# Patient Record
Sex: FEMALE | HISPANIC OR LATINO | URBAN - METROPOLITAN AREA
[De-identification: names, ages, dates, MRNs, and addresses within clinical notes are randomized per-mention and may not be internally consistent; named-entity substitution may affect disease eponyms.]

---

## 2018-10-03 ENCOUNTER — APPOINTMENT (RX ONLY)
Dept: URBAN - METROPOLITAN AREA CLINIC 76 | Facility: CLINIC | Age: 38
Setting detail: DERMATOLOGY
End: 2018-10-03

## 2018-10-03 DIAGNOSIS — L663 OTHER SPECIFIED DISEASES OF HAIR AND HAIR FOLLICLES: ICD-10-CM

## 2018-10-03 DIAGNOSIS — L81.4 OTHER MELANIN HYPERPIGMENTATION: ICD-10-CM

## 2018-10-03 DIAGNOSIS — L90.5 SCAR CONDITIONS AND FIBROSIS OF SKIN: ICD-10-CM

## 2018-10-03 DIAGNOSIS — L73.9 FOLLICULAR DISORDER, UNSPECIFIED: ICD-10-CM

## 2018-10-03 DIAGNOSIS — L738 OTHER SPECIFIED DISEASES OF HAIR AND HAIR FOLLICLES: ICD-10-CM

## 2018-10-03 PROBLEM — L02.821 FURUNCLE OF HEAD [ANY PART, EXCEPT FACE]: Status: ACTIVE | Noted: 2018-10-03

## 2018-10-03 PROCEDURE — 99202 OFFICE O/P NEW SF 15 MIN: CPT

## 2018-10-03 PROCEDURE — ? IN-HOUSE DISPENSING PHARMACY

## 2018-10-03 PROCEDURE — ? TREATMENT REGIMEN

## 2018-10-03 PROCEDURE — ? COUNSELING

## 2018-10-03 ASSESSMENT — LOCATION DETAILED DESCRIPTION DERM
LOCATION DETAILED: LEFT OCCIPITAL SCALP
LOCATION DETAILED: LEFT INFERIOR CENTRAL MALAR CHEEK
LOCATION DETAILED: RIGHT LATERAL ABDOMEN
LOCATION DETAILED: PERIUMBILICAL SKIN

## 2018-10-03 ASSESSMENT — LOCATION SIMPLE DESCRIPTION DERM
LOCATION SIMPLE: POSTERIOR SCALP
LOCATION SIMPLE: ABDOMEN
LOCATION SIMPLE: LEFT CHEEK

## 2018-10-03 ASSESSMENT — LOCATION ZONE DERM
LOCATION ZONE: TRUNK
LOCATION ZONE: FACE
LOCATION ZONE: SCALP

## 2018-10-03 ASSESSMENT — PAIN INTENSITY VAS: HOW INTENSE IS YOUR PAIN 0 BEING NO PAIN, 10 BEING THE MOST SEVERE PAIN POSSIBLE?: NO PAIN

## 2018-10-03 NOTE — PROCEDURE: IN-HOUSE DISPENSING PHARMACY
Product 46 Unit Type: mg
Product 65 Refills: 0
Product 14 West/Unit (In Dollars): 40.00
Product 15 Application Directions: apply to affected areas 30 minprior treatment
Product 4 Price/Unit (In Dollars): 50.00
Name Of Product 14: Hydroquin Combo cream - 123167
Product 12 Price/Unit (In Dollars): 40
Product 10 Application Directions: Apply bid to affected areas
Name Of Product 21: Imiqui 5% / Levo 1% Gel - 030728
Name Of Product 41: Hydroquin 6% Combo Cream - 585856
Detail Level: Zone
Product 3 Application Directions: Apply to acne prone area after moisturizer. Avoid eyelids.
Product 4 Application Directions: Apply to affected area before moisturizer one time a day.
Product 11 Price/Unit (In Dollars): 50
Product 14 Application Directions: Apply to affected area qd
Product 51 Application Directions: Apply to affected nails daily for 10 months.
Product 5 Application Directions: Apply to affected area in the evening after moisturizer.  Avoid eyelids.
Product 2 Application Directions: Apply to affected area in the evening after moisturizer.  Avoid eyelids.
Product 15 Amount/Unit (Numbers Only): 40
Product 7 Application Directions: Apply to affected area one time a day.
Render Refills If Set To 0: Yes
Product 33 Application Directions: Apply to affected area two times a day.
Name Of Product 24: Triamcin 1% Ointment - 317819
Name Of Product 4: Acne Gel w/ Dapsone - 764248
Name Of Product 51: Anti- Fungal Nail Solution - 271434
Product 12 Application Directions: Apply to affected area qhs
Name Of Product 7: Spironolactone - 274740
Name Of Product 2: Anti-Aging Tretinoin 0.025% - 62979
Name Of Product 12: Azelaic Acid - 215220
Name Of Product 15: Pre- Treatment numbing - 201012
Name Of Product 5: Clind / Tret Combo Cream - 310102
Name Of Product 1: Tret 0.05% Cream - 188302
Product 42 Application Directions: Apply to hyperpigmented areas in the evening after moisturizer.
Product 9 West/Unit (In Dollars): 50
Product 13 Application Directions: Apply to affected area daily
Product 14 Units Dispensed: 1
Name Of Product 3: Jeffrey / Clind Combo - 421498
Name Of Product 33: Tacro 0.1% Ointment - 679691
Name Of Product 42: Kojic Melasma Cream - 760533
Product 10 Price/Unit (In Dollars): 45
Product 9 Application Directions: Apply to affected area every other night
Name Of Product 6: Adap Combo Cream - 260236
Name Of Product 10: Clob 0.05% Ointment 60gm - 107164
Product 1 Application Directions: Apply qhs to affected areas
Name Of Product 31: Ivermec 1% / Met 1% Gel - 398414
Name Of Product 13: Rosacea Triple gel - 906046
Name Of Product 9: Imiqui 5% / Levo 1% Gel -044381
Name Of Product 8: Taza 0.1% Cream - 065695
Name Of Product 11: Tacro 0.1% Ointment
Product 21 Application Directions: Apply to affected area in the evening or every other evening.